# Patient Record
Sex: FEMALE | Race: WHITE | NOT HISPANIC OR LATINO
[De-identification: names, ages, dates, MRNs, and addresses within clinical notes are randomized per-mention and may not be internally consistent; named-entity substitution may affect disease eponyms.]

---

## 2017-03-08 ENCOUNTER — APPOINTMENT (OUTPATIENT)
Dept: FAMILY MEDICINE | Facility: CLINIC | Age: 72
End: 2017-03-08

## 2017-03-08 VITALS
SYSTOLIC BLOOD PRESSURE: 120 MMHG | WEIGHT: 138 LBS | HEART RATE: 84 BPM | HEIGHT: 64 IN | DIASTOLIC BLOOD PRESSURE: 70 MMHG | BODY MASS INDEX: 23.56 KG/M2

## 2017-03-08 DIAGNOSIS — S82.002A UNSPECIFIED FRACTURE OF LEFT PATELLA, INITIAL ENCOUNTER FOR CLOSED FRACTURE: ICD-10-CM

## 2017-03-08 DIAGNOSIS — N39.0 URINARY TRACT INFECTION, SITE NOT SPECIFIED: ICD-10-CM

## 2017-03-08 DIAGNOSIS — R74.0 NONSPECIFIC ELEVATION OF LEVELS OF TRANSAMINASE AND LACTIC ACID DEHYDROGENASE [LDH]: ICD-10-CM

## 2017-08-30 ENCOUNTER — RX RENEWAL (OUTPATIENT)
Age: 72
End: 2017-08-30

## 2018-03-05 ENCOUNTER — RX RENEWAL (OUTPATIENT)
Age: 73
End: 2018-03-05

## 2018-03-19 ENCOUNTER — APPOINTMENT (OUTPATIENT)
Dept: FAMILY MEDICINE | Facility: CLINIC | Age: 73
End: 2018-03-19
Payer: MEDICARE

## 2018-03-19 ENCOUNTER — NON-APPOINTMENT (OUTPATIENT)
Age: 73
End: 2018-03-19

## 2018-03-19 VITALS
HEART RATE: 70 BPM | HEIGHT: 64 IN | DIASTOLIC BLOOD PRESSURE: 70 MMHG | BODY MASS INDEX: 24.75 KG/M2 | WEIGHT: 145 LBS | SYSTOLIC BLOOD PRESSURE: 132 MMHG

## 2018-03-19 DIAGNOSIS — Z12.31 ENCOUNTER FOR SCREENING MAMMOGRAM FOR MALIGNANT NEOPLASM OF BREAST: ICD-10-CM

## 2018-03-19 DIAGNOSIS — Z23 ENCOUNTER FOR IMMUNIZATION: ICD-10-CM

## 2018-03-19 PROCEDURE — 90732 PPSV23 VACC 2 YRS+ SUBQ/IM: CPT

## 2018-03-19 PROCEDURE — 99214 OFFICE O/P EST MOD 30 MIN: CPT | Mod: 25

## 2018-03-19 PROCEDURE — 93000 ELECTROCARDIOGRAM COMPLETE: CPT

## 2018-03-19 PROCEDURE — G0009: CPT

## 2018-06-28 ENCOUNTER — APPOINTMENT (OUTPATIENT)
Dept: FAMILY MEDICINE | Facility: CLINIC | Age: 73
End: 2018-06-28
Payer: MEDICARE

## 2018-06-28 VITALS
DIASTOLIC BLOOD PRESSURE: 60 MMHG | WEIGHT: 140 LBS | HEIGHT: 65 IN | BODY MASS INDEX: 23.32 KG/M2 | HEART RATE: 63 BPM | SYSTOLIC BLOOD PRESSURE: 155 MMHG

## 2018-06-28 VITALS — SYSTOLIC BLOOD PRESSURE: 130 MMHG | DIASTOLIC BLOOD PRESSURE: 88 MMHG

## 2018-06-28 DIAGNOSIS — R82.90 UNSPECIFIED ABNORMAL FINDINGS IN URINE: ICD-10-CM

## 2018-06-28 PROCEDURE — 99214 OFFICE O/P EST MOD 30 MIN: CPT | Mod: 25

## 2018-06-28 PROCEDURE — 81003 URINALYSIS AUTO W/O SCOPE: CPT | Mod: QW

## 2018-06-28 NOTE — PLAN
[FreeTextEntry1] : STILL NEEDS TO SEE GYN.  STILL NEEDS TO HAVE STOOL OCCULT.  STILL NEEDS MAMMOGRAM AND BONE DENSITY.  STILL NEEDS LAB WORK.\par WILL RECHECK URINE STUDIES\par REMAIN HYDRATED\par MONITOR BLOOD PRESSURE\par COMPLIANCE ADVISED TO PREVENT ADVERSE OUTCOMES TO HEALTH \par CALL WITH ANY QUESTIONS, CONCERNS OR CHANGES

## 2018-06-28 NOTE — REVIEW OF SYSTEMS
[Fever] : no fever [Fatigue] : no fatigue [Discharge] : no discharge [Pain] : no pain [Chest Pain] : no chest pain [Palpitations] : no palpitations [Shortness Of Breath] : no shortness of breath [Cough] : no cough [Abdominal Pain] : no abdominal pain [Nausea] : no nausea [Diarrhea] : diarrhea [Vomiting] : no vomiting [Dysuria] : no dysuria [Hematuria] : no hematuria [Headache] : no headache [Dizziness] : no dizziness [Fainting] : no fainting

## 2018-06-28 NOTE — HISTORY OF PRESENT ILLNESS
[FreeTextEntry8] : PRESENTING FOR ACUTE VISIT TO FOLLOW-UP UTI.  SYMPTOMS STARTED ON MONDAY.  WENT TO URGENT CARE.  GIVEN BACTRIM - GAVE HER "ACHES AND PAINS" AND STOPPED IT.  ONLY TOOK ONE DOSE.  NOW DOESN'T HAVE ANY URINARY TRACT INFECTION SYMPTOMS.  NO D/H/F.  NO HEMATURIA.  NO FEVER OR BACK PAIN.  HAS A CHRONIC HISTORY HYPERTENSION.  TAKING PROPRANOLOL AS DIRECTED.   HAS HAD NO HEADACHE, DIZZINESS, CHEST PAIN, SHORTNESS OF BREATH, GI COMPLAINTS.  NO OTHER COMPLAINTS TODAY. STILL NEEDS TO SEE GYN.  STILL NEEDS TO HAVE STOOL OCCULT.  STILL NEEDS MAMMOGRAM AND BONE DENSITY.  STILL NEEDS LAB WORK.

## 2018-06-28 NOTE — PHYSICAL EXAM
[No Acute Distress] : no acute distress [Well Nourished] : well nourished [Well-Appearing] : well-appearing [Normal Sclera/Conjunctiva] : normal sclera/conjunctiva [PERRL] : pupils equal round and reactive to light [No Respiratory Distress] : no respiratory distress  [Clear to Auscultation] : lungs were clear to auscultation bilaterally [No Accessory Muscle Use] : no accessory muscle use [Normal Rate] : normal rate  [Regular Rhythm] : with a regular rhythm [Normal S1, S2] : normal S1 and S2 [No Murmur] : no murmur heard [Soft] : abdomen soft [Non Tender] : non-tender [Normal Bowel Sounds] : normal bowel sounds [No CVA Tenderness] : no CVA  tenderness [No Joint Swelling] : no joint swelling [Grossly Normal Strength/Tone] : grossly normal strength/tone [Speech Grossly Normal] : speech grossly normal [Memory Grossly Normal] : memory grossly normal [Normal Insight/Judgement] : insight and judgment were intact

## 2018-07-02 ENCOUNTER — RESULT REVIEW (OUTPATIENT)
Age: 73
End: 2018-07-02

## 2018-07-02 LAB
BILIRUB UR QL STRIP: NEGATIVE
CLARITY UR: CLEAR
GLUCOSE UR-MCNC: NEGATIVE
HCG UR QL: NORMAL EU/DL
HGB UR QL STRIP.AUTO: NORMAL
KETONES UR-MCNC: NEGATIVE
LEUKOCYTE ESTERASE UR QL STRIP: NEGATIVE
NITRITE UR QL STRIP: NEGATIVE
PH UR STRIP: 5.5
PROT UR STRIP-MCNC: NEGATIVE
SP GR UR STRIP: <=1.005

## 2018-07-03 LAB — BACTERIA UR CULT: NORMAL

## 2018-07-06 LAB — HEMOCCULT STL QL IA: NEGATIVE

## 2018-07-17 ENCOUNTER — RESULT REVIEW (OUTPATIENT)
Age: 73
End: 2018-07-17

## 2018-07-24 ENCOUNTER — OTHER (OUTPATIENT)
Age: 73
End: 2018-07-24

## 2018-08-20 ENCOUNTER — APPOINTMENT (OUTPATIENT)
Dept: FAMILY MEDICINE | Facility: CLINIC | Age: 73
End: 2018-08-20
Payer: MEDICARE

## 2018-08-20 VITALS
HEART RATE: 84 BPM | WEIGHT: 151 LBS | SYSTOLIC BLOOD PRESSURE: 140 MMHG | HEIGHT: 65 IN | DIASTOLIC BLOOD PRESSURE: 90 MMHG | BODY MASS INDEX: 25.16 KG/M2

## 2018-08-20 DIAGNOSIS — N64.59 OTHER SIGNS AND SYMPTOMS IN BREAST: ICD-10-CM

## 2018-08-20 PROCEDURE — 99214 OFFICE O/P EST MOD 30 MIN: CPT

## 2018-08-20 NOTE — PLAN
[FreeTextEntry1] : RECENT LAB WORK REVIEWED\par RECHECK TFT'S\par REMAIN HYDRATED\par RECHECK CREATININE\par MONITOR BLOOD PRESSURE\par DUE FOR CPE\par CHECK BREAST IMAGING\par CALL WITH ANY QUESTIONS, CONCERNS OR CHANGES \par FLU VACCINE IN THE FALL RECOMMENDED

## 2018-08-20 NOTE — PHYSICAL EXAM
[No Acute Distress] : no acute distress [Well Nourished] : well nourished [Well-Appearing] : well-appearing [Supple] : supple [No Lymphadenopathy] : no lymphadenopathy [No Respiratory Distress] : no respiratory distress  [Clear to Auscultation] : lungs were clear to auscultation bilaterally [No Accessory Muscle Use] : no accessory muscle use [Normal Rate] : normal rate  [Regular Rhythm] : with a regular rhythm [Normal S1, S2] : normal S1 and S2 [No Murmur] : no murmur heard [Soft] : abdomen soft [Non Tender] : non-tender [Normal Bowel Sounds] : normal bowel sounds [No Joint Swelling] : no joint swelling [Grossly Normal Strength/Tone] : grossly normal strength/tone [Speech Grossly Normal] : speech grossly normal [Normal Mood] : the mood was normal [Normal Insight/Judgement] : insight and judgment were intact

## 2018-08-20 NOTE — REVIEW OF SYSTEMS
[Fever] : no fever [Fatigue] : no fatigue [Chest Pain] : no chest pain [Palpitations] : no palpitations [Shortness Of Breath] : no shortness of breath [Cough] : no cough [Abdominal Pain] : no abdominal pain [Nausea] : no nausea [Diarrhea] : diarrhea [Vomiting] : no vomiting [Melena] : no melena [Dysuria] : no dysuria [Hematuria] : no hematuria [Joint Pain] : no joint pain [Muscle Pain] : no muscle pain [Headache] : no headache [Dizziness] : no dizziness [Fainting] : no fainting

## 2018-08-20 NOTE — HISTORY OF PRESENT ILLNESS
[FreeTextEntry1] : F/U LAB WORK [de-identified] : PRESENTING FOR FOLLOW-UP OF LAB WORK.  CHRONIC HISTORY OF HYPERTENSION ON PROPRANOLOL.  BLOOD PRESSURE CONTROLLED.  ELEVATED CREATININE.  NO D/H/F OR HEMATURIA.  SEES ENDOCRINOLOGY DR. GROVE YEARLY FOR MONITORING OF THYROID SONOGRAM.  HAS THYROID NODULE.  HAS HAD NO CHANGE.  NEVER ON MEDICATION FOR THYROID.  NO FATIGUE.  NO CHANGES IN HAIR OR SKIN.  HAD MAMMOGRAM.  NEEDS TO GO BACK FOR ADDITIONAL IMAGING INCLUDING DIAGNOSTIC MAMMOGRAM AND BREAST SONOGRAM.  DENIES ANY BREAST COMPLAINTS.  NO PAIN, DISCHARGE OR LUMPS.

## 2018-09-18 ENCOUNTER — RX RENEWAL (OUTPATIENT)
Age: 73
End: 2018-09-18

## 2019-03-14 ENCOUNTER — RX RENEWAL (OUTPATIENT)
Age: 74
End: 2019-03-14

## 2019-05-06 ENCOUNTER — NON-APPOINTMENT (OUTPATIENT)
Age: 74
End: 2019-05-06

## 2019-05-06 ENCOUNTER — APPOINTMENT (OUTPATIENT)
Dept: FAMILY MEDICINE | Facility: CLINIC | Age: 74
End: 2019-05-06
Payer: MEDICARE

## 2019-05-06 VITALS
DIASTOLIC BLOOD PRESSURE: 80 MMHG | BODY MASS INDEX: 23.82 KG/M2 | SYSTOLIC BLOOD PRESSURE: 128 MMHG | HEART RATE: 80 BPM | WEIGHT: 143 LBS | HEIGHT: 65 IN

## 2019-05-06 PROCEDURE — 99213 OFFICE O/P EST LOW 20 MIN: CPT | Mod: 25

## 2019-05-06 PROCEDURE — 36415 COLL VENOUS BLD VENIPUNCTURE: CPT

## 2019-05-06 PROCEDURE — 93000 ELECTROCARDIOGRAM COMPLETE: CPT

## 2019-05-06 NOTE — PHYSICAL EXAM
[No Acute Distress] : no acute distress [Well Nourished] : well nourished [Well-Appearing] : well-appearing [No Respiratory Distress] : no respiratory distress  [Clear to Auscultation] : lungs were clear to auscultation bilaterally [Normal Rate] : normal rate  [No Accessory Muscle Use] : no accessory muscle use [Regular Rhythm] : with a regular rhythm [Normal S1, S2] : normal S1 and S2 [No Murmur] : no murmur heard [Pedal Pulses Present] : the pedal pulses are present [No Edema] : there was no peripheral edema

## 2019-05-11 NOTE — ADDENDUM
[FreeTextEntry1] : I, Lizzie Warren, acted solely as a scribe for Dr. Samantha Parish on this date 5/6/19.

## 2019-05-11 NOTE — HISTORY OF PRESENT ILLNESS
[FreeTextEntry1] : BLOOD PRESSURE CHECK  [de-identified] : MS. CASTANEDA IS A PLEASANT 72 YO PRESENTING FOR FOLLOW UP.  CHRONIC HISTORY OF HYPERTENSION. BLOOD PRESSURE CONTROLLED ON PROPRANOLOL. HAS NOT RECENTLY SEEN CARDIOLOGY. DUE FOR ECHOCARDIOGRAM. NO CONCERNS AT THIS TIME. ALSO DUE FOR GYN FOLLOW-UP. SAW ENDOCRINOLOGY IN [facility] FOR FOLLOW UP OF THYROID NODULE. ADVISED TO RETURN IN A YEAR. HAS HAD NO CHEST PAIN, SHORTNESS OF BREATH, HEADACHE, DIZZINESS, GI OR URINARY COMPLAINTS. NO OTHER COMPLAINTS TODAY.

## 2019-05-11 NOTE — PLAN
[FreeTextEntry1] : BLOOD PRESSURE CONTROLLED\par EKG: NSR AT 68 BPM, NO ACUTE ST-T WAVE CHANGES\par DUE TO SEE CARDIOLOGY\par REFUSES COLONOSCOPY\par CONTINUE MEDICATIONS AS DIRECTED\par FOLLOW UP WITH ALL SPECIALISTS AS DIRECTED\par CONSIDER SHINGRIX \par FOLLOW UP FOR CPE\par HEALTHY DIET AND LIFESTYLE MODIFICATIONS\par CALL WITH ANY QUESTIONS, CONCERNS OR CHANGES

## 2019-05-12 LAB
ALBUMIN SERPL ELPH-MCNC: 4.3 G/DL
ALP BLD-CCNC: 65 U/L
ALT SERPL-CCNC: 16 U/L
ANION GAP SERPL CALC-SCNC: 11 MMOL/L
AST SERPL-CCNC: 17 U/L
BILIRUB SERPL-MCNC: 0.4 MG/DL
BUN SERPL-MCNC: 15 MG/DL
CALCIUM SERPL-MCNC: 9.5 MG/DL
CHLORIDE SERPL-SCNC: 103 MMOL/L
CHOLEST SERPL-MCNC: 213 MG/DL
CHOLEST/HDLC SERPL: 2.6 RATIO
CO2 SERPL-SCNC: 26 MMOL/L
CREAT SERPL-MCNC: 1.13 MG/DL
GLUCOSE SERPL-MCNC: 94 MG/DL
HDLC SERPL-MCNC: 82 MG/DL
LDLC SERPL CALC-MCNC: 120 MG/DL
POTASSIUM SERPL-SCNC: 4.9 MMOL/L
PROT SERPL-MCNC: 6.5 G/DL
SODIUM SERPL-SCNC: 140 MMOL/L
TRIGL SERPL-MCNC: 53 MG/DL

## 2019-07-17 ENCOUNTER — APPOINTMENT (OUTPATIENT)
Dept: FAMILY MEDICINE | Facility: CLINIC | Age: 74
End: 2019-07-17
Payer: MEDICARE

## 2019-07-17 VITALS
SYSTOLIC BLOOD PRESSURE: 110 MMHG | BODY MASS INDEX: 24.24 KG/M2 | WEIGHT: 142 LBS | HEIGHT: 64 IN | DIASTOLIC BLOOD PRESSURE: 76 MMHG | HEART RATE: 78 BPM

## 2019-07-17 DIAGNOSIS — Z63.4 DISAPPEARANCE AND DEATH OF FAMILY MEMBER: ICD-10-CM

## 2019-07-17 PROCEDURE — G0439: CPT

## 2019-07-17 SDOH — SOCIAL STABILITY - SOCIAL INSECURITY: DISSAPEARANCE AND DEATH OF FAMILY MEMBER: Z63.4

## 2019-07-17 NOTE — HEALTH RISK ASSESSMENT
[Very Good] : ~his/her~  mood as very good [No falls in past year] : Patient reported no falls in the past year [Patient reported mammogram was normal] : Patient reported mammogram was normal [Patient reported bone density results were normal] : Patient reported bone density results were normal [Yes] : Yes [Monthly or less (1 pt)] : Monthly or less (1 point) [No] : In the past 12 months have you used drugs other than those required for medical reasons? No [Patient declined PAP Smear] : Patient declined PAP Smear [Patient declined colonoscopy] : Patient declined colonoscopy [None] : None [Alone] : lives alone [Retired] : retired [] :  [# Of Children ___] : has [unfilled] children [Smoke Detector] : smoke detector [Carbon Monoxide Detector] : carbon monoxide detector [Safety elements used in home] : safety elements used in home [Seat Belt] :  uses seat belt [Sunscreen] : uses sunscreen [0] : 2) Feeling down, depressed, or hopeless: Not at all (0) [Feels Safe at Home] : Feels safe at home [Fully functional (bathing, dressing, toileting, transferring, walking, feeding)] : Fully functional (bathing, dressing, toileting, transferring, walking, feeding) [Fully functional (using the telephone, shopping, preparing meals, housekeeping, doing laundry, using] : Fully functional and needs no help or supervision to perform IADLs (using the telephone, shopping, preparing meals, housekeeping, doing laundry, using transportation, managing medications and managing finances) [Reports normal functional visual acuity (ie: able to read med bottle)] : Reports normal functional visual acuity [With Patient/Caregiver] : With Patient/Caregiver [Designated Healthcare Proxy] : Designated healthcare proxy [Name: ___] : Health Care Proxy's Name: [unfilled]  [Relationship: ___] : Relationship: [unfilled] [Never (0 pts)] : Never (0 points) [HIV test declined] : HIV test declined [Hepatitis C test declined] : Hepatitis C test declined [] : No [de-identified] : RARELY [Audit-CScore] : 1 [de-identified] : WALKING AND AEROBICS ONCE A WEEK [de-identified] : GOOD [[id number]] : 0 [[id number]] : 0 [Change in mental status noted] : No change in mental status noted [Language] : denies difficulty with language [Behavior] : denies difficulty with behavior [Learning/Retaining New Information] : denies difficulty learning/retaining new information [Handling Complex Tasks] : denies difficulty handling complex tasks [Reasoning] : denies difficulty with reasoning [Spatial Ability and Orientation] : denies difficulty with spatial ability and orientation [Sexually Active] : not sexually active [High Risk Behavior] : no high risk behavior [Reports changes in hearing] : Reports no changes in hearing [Reports changes in vision] : Reports no changes in vision [Reports changes in dental health] : Reports no changes in dental health [MammogramDate] : 8/18 [BoneDensityDate] : 7/18 [de-identified] : GLASSES FOR READING [AdvancecareDate] : 7/19

## 2019-07-17 NOTE — COUNSELING
[Healthy eating counseling provided] : healthy eating [Low Fat Diet] : Low fat diet [Low Salt Diet] : Low salt diet [Activity counseling provided] : activity [Walking] : Walking [None] : None

## 2019-07-17 NOTE — PHYSICAL EXAM
[No Acute Distress] : no acute distress [Well Nourished] : well nourished [Well-Appearing] : well-appearing [Normal Sclera/Conjunctiva] : normal sclera/conjunctiva [PERRL] : pupils equal round and reactive to light [Normal Outer Ear/Nose] : the outer ears and nose were normal in appearance [Normal Oropharynx] : the oropharynx was normal [Normal TMs] : both tympanic membranes were normal [No Lymphadenopathy] : no lymphadenopathy [Supple] : supple [No Respiratory Distress] : no respiratory distress  [No Accessory Muscle Use] : no accessory muscle use [Clear to Auscultation] : lungs were clear to auscultation bilaterally [Normal Rate] : normal rate  [Regular Rhythm] : with a regular rhythm [Normal S1, S2] : normal S1 and S2 [No Murmur] : no murmur heard [Pedal Pulses Present] : the pedal pulses are present [No Edema] : there was no peripheral edema [Soft] : abdomen soft [Non Tender] : non-tender [Normal Bowel Sounds] : normal bowel sounds [Coordination Grossly Intact] : coordination grossly intact [No Focal Deficits] : no focal deficits [Normal Gait] : normal gait [Deep Tendon Reflexes (DTR)] : deep tendon reflexes were 2+ and symmetric [Speech Grossly Normal] : speech grossly normal [Normal Affect] : the affect was normal [Normal Mood] : the mood was normal [Normal Insight/Judgement] : insight and judgment were intact [EOMI] : extraocular movements intact [No Joint Swelling] : no joint swelling [Grossly Normal Strength/Tone] : grossly normal strength/tone [No Rash] : no rash

## 2019-07-17 NOTE — PLAN
[FreeTextEntry1] : CHECK ROUTINE LAB WORK \par STOOL OCCULT\par VISION SCREENING\par CONSIDER TDAP AND SHINGRIX\par FLU VACCINE IN THE FALL RECOMMENDED \par HEALTHY DIET AND LIFESTYLE MODIFICATIONS\par BLOOD PRESSURE CONTROLLED\par CONTINUE ALL MEDICATIONS AS DIRECTED\par REFERRAL TO RHEUMATOLOGY FOR OSTEOPOROSIS RE-ADVISED\par REFUSES GYN OR COLONOSCOPY\par FOLLOW-UP WITH ALL SPECIALISTS AS DIRECTED \par EKG DECLINED; CARDIOLOGY FOR ECHOCARDIOGRAM AND SCREENING EXAMS\par CALL WITH ANY QUESTIONS, CONCERNS OR CHANGES.

## 2019-07-17 NOTE — HISTORY OF PRESENT ILLNESS
[FreeTextEntry1] : WELLNESS EXAM. [de-identified] : MS. CASTANEDA IS A PLEASANT 72 YO PRESENTING FOR YEARLY WELLNESS EXAM. CHRONIC HISTORY OF HYPERTENSION. BLOOD PRESSURE CONTROLLED ON PROPRANOLOL. SEES ENDOCRINOLOGY [facility] IN FOLLOW-UP OF THYROID. WILL NOT FOLLOW -UP WITH GYN OR GO FOR COLONOSCOPY. WILL BE DUE FOR MAMMOGRAM NEXT MONTH. NO BREAST PAIN, LUMPS OR NIPPLE DISCHARGE.  HAS NOT FOLLOWED UP WITH RHEUMATOLOGY FOR OSTEOPOROSIS. DUE TO SEE CARDIOLOGY. HAS YEARLY EYE EXAM. HAS HAD NO CHEST PAIN, SHORTNESS OF BREATH, HEADACHE, DIZZINESS, GI OR URINARY COMPLAINTS. NO OTHER COMPLAINTS TODAY. \par

## 2019-12-03 ENCOUNTER — RX RENEWAL (OUTPATIENT)
Age: 74
End: 2019-12-03

## 2020-08-05 ENCOUNTER — NON-APPOINTMENT (OUTPATIENT)
Age: 75
End: 2020-08-05

## 2020-08-05 ENCOUNTER — APPOINTMENT (OUTPATIENT)
Dept: FAMILY MEDICINE | Facility: CLINIC | Age: 75
End: 2020-08-05
Payer: MEDICARE

## 2020-08-05 VITALS — SYSTOLIC BLOOD PRESSURE: 126 MMHG | DIASTOLIC BLOOD PRESSURE: 80 MMHG

## 2020-08-05 VITALS
HEART RATE: 88 BPM | WEIGHT: 143 LBS | SYSTOLIC BLOOD PRESSURE: 148 MMHG | DIASTOLIC BLOOD PRESSURE: 70 MMHG | BODY MASS INDEX: 24.41 KG/M2 | HEIGHT: 64 IN

## 2020-08-05 DIAGNOSIS — M72.2 PLANTAR FASCIAL FIBROMATOSIS: ICD-10-CM

## 2020-08-05 PROCEDURE — 93000 ELECTROCARDIOGRAM COMPLETE: CPT

## 2020-08-05 PROCEDURE — 99214 OFFICE O/P EST MOD 30 MIN: CPT | Mod: 25

## 2020-08-05 NOTE — HISTORY OF PRESENT ILLNESS
[FreeTextEntry1] : f/u hypertension [de-identified] : MS. CASTANEDA IS A PLEASANT 73 YO PRESENTING FOR FOLLOW-UP OF HYPERTENSION.  ON PROPRANOLOL DAILY AS DIRECTED.  DOES NOT MONITOR BLOOD PRESSURE AT HOME.  LAST CARDIOLOGY EVALUATION YEARS AGO.  FEELING WELL TODAY.  IS DUE TO SEE ENDOCRINOLOGY IN FOLLOW-UP OF THYROID.  HISTORY OF OSTEOPOROSIS.  NO RECENT FALLS.  HAS DECLINED MEDICATION PREVIOUSLY.  PRIOR ELEVATED CHOLESTEROL BUT GOOD HDL.  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   HAS HAD NO HEADACHE, DIZZINESS, CHEST PAIN, SHORTNESS OF BREATH, GI OR URINARY COMPLAINTS.  NO OTHER COMPLAINTS TODAY.

## 2020-08-05 NOTE — PHYSICAL EXAM
[No Acute Distress] : no acute distress [Well Nourished] : well nourished [Well-Appearing] : well-appearing [Normal Sclera/Conjunctiva] : normal sclera/conjunctiva [PERRL] : pupils equal round and reactive to light [No Respiratory Distress] : no respiratory distress  [Supple] : supple [No Lymphadenopathy] : no lymphadenopathy [Normal Rate] : normal rate  [No Accessory Muscle Use] : no accessory muscle use [Clear to Auscultation] : lungs were clear to auscultation bilaterally [Regular Rhythm] : with a regular rhythm [Soft] : abdomen soft [Normal S1, S2] : normal S1 and S2 [Normal Bowel Sounds] : normal bowel sounds [No Joint Swelling] : no joint swelling [Non Tender] : non-tender [Grossly Normal Strength/Tone] : grossly normal strength/tone

## 2020-08-05 NOTE — PLAN
[FreeTextEntry1] : WILL NO LONGER SEE GYN OR GO FOR MAMMOGRAM - AWARE TO SCREEN FOR CANCER\par CHECK BONE DENSITY\par CHECK LAB WORK\par EKG: NSR AT 63 BPM, NO ACUTE ST-T WAVE CHANGES\par CARDIOLOGY FOLLOW-UP DUE\par BLOOD PRESSURE CONTROLLED\par CONTINUE CURRENT MEDICATIONS\par FOLLOW-UP ALL SPECIALISTS AS DIRECTED \par FOLLOW-UP FOR CPE\par FLU VACCINE IN THE FALL\par CALL WITH ANY QUESTIONS, CONCERNS OR CHANGES

## 2021-04-28 ENCOUNTER — RX RENEWAL (OUTPATIENT)
Age: 76
End: 2021-04-28

## 2021-05-10 ENCOUNTER — LABORATORY RESULT (OUTPATIENT)
Age: 76
End: 2021-05-10

## 2021-05-10 ENCOUNTER — APPOINTMENT (OUTPATIENT)
Dept: RHEUMATOLOGY | Facility: CLINIC | Age: 76
End: 2021-05-10
Payer: MEDICARE

## 2021-05-10 VITALS
HEART RATE: 76 BPM | HEIGHT: 64 IN | DIASTOLIC BLOOD PRESSURE: 78 MMHG | RESPIRATION RATE: 17 BRPM | TEMPERATURE: 98.1 F | SYSTOLIC BLOOD PRESSURE: 120 MMHG | OXYGEN SATURATION: 99 %

## 2021-05-10 DIAGNOSIS — Z87.19 PERSONAL HISTORY OF OTHER DISEASES OF THE DIGESTIVE SYSTEM: ICD-10-CM

## 2021-05-10 DIAGNOSIS — Z82.49 FAMILY HISTORY OF ISCHEMIC HEART DISEASE AND OTHER DISEASES OF THE CIRCULATORY SYSTEM: ICD-10-CM

## 2021-05-10 DIAGNOSIS — Z87.891 PERSONAL HISTORY OF NICOTINE DEPENDENCE: ICD-10-CM

## 2021-05-10 PROCEDURE — 99203 OFFICE O/P NEW LOW 30 MIN: CPT

## 2021-05-10 NOTE — HISTORY OF PRESENT ILLNESS
[FreeTextEntry1] : 75 year old female with PMH as listed below presents today for an initial evaluation for OP\par \par Last DEXA 10/2020: OP. L1-L4: T score: -1.7, Left femoral neck: T -2.7. Left total: -2.3. \par Currently taking calcium/vitamin d supplements\par previously treated with medications > 5 years ago by GYN, doesn’t remember name. \par hx of patella fxs\par + mother with hip fracture\par denies estrogen use\par denies steroid use\par denies hx of recent falls\par denies any upcoming or planned jaw surgery ,extractions, etc.\par denies cigarette. denies alcohol use\par last menses: 1994\par +duodenal ulcers\par

## 2021-05-10 NOTE — ASSESSMENT
[FreeTextEntry1] : OP, postmenopausal\par \par - labs as below\par - calcium and vitamin d supplementation. \par - regular exercise: aerobic and resistance\par - fall prevention\par - not a candidate for oral bisphosphonates as she has hx of duodenal ulcers\par - will plan to start therapy with IV Reclast after reviewing labs\par \par Discussed with the patient risks and benefits of bisphosphonates including, but not limited to, loss of appetite atypical bone fractures, gastritis, hypocalcemia, esophagitis, gastrointestinal strictures, bone and muscle pain, TM/jaw osteonecrosis (bone necrosis)\par \par Discussed treatment plan with the patient. The patient was given the opportunity to ask questions and all questions were answered to their satisfaction.

## 2021-05-10 NOTE — PHYSICAL EXAM
[General Appearance - Alert] : alert [General Appearance - In No Acute Distress] : in no acute distress [General Appearance - Well Nourished] : well nourished [General Appearance - Well Developed] : well developed [Sclera] : the sclera and conjunctiva were normal [Extraocular Movements] : extraocular movements were intact [PERRL With Normal Accommodation] : pupils were equal in size, round, and reactive to light [Outer Ear] : the ears and nose were normal in appearance [Neck Appearance] : the appearance of the neck was normal [Jugular Venous Distention Increased] : there was no jugular-venous distention [Auscultation Breath Sounds / Voice Sounds] : lungs were clear to auscultation bilaterally [Heart Rate And Rhythm] : heart rate was normal and rhythm regular [Heart Sounds] : normal S1 and S2 [Heart Sounds Gallop] : no gallops [Murmurs] : no murmurs [Heart Sounds Pericardial Friction Rub] : no pericardial rub [Bowel Sounds] : normal bowel sounds [Abdomen Soft] : soft [Abdomen Tenderness] : non-tender [No CVA Tenderness] : no ~M costovertebral angle tenderness [No Spinal Tenderness] : no spinal tenderness [Abnormal Walk] : normal gait [Nail Clubbing] : no clubbing  or cyanosis of the fingernails [Involuntary Movements] : no involuntary movements were seen [Musculoskeletal - Swelling] : no joint swelling seen [Motor Tone] : muscle strength and tone were normal [] : no rash [Skin Lesions] : no skin lesions [Sensation] : the sensory exam was normal to light touch and pinprick [Motor Exam] : the motor exam was normal [No Focal Deficits] : no focal deficits [Oriented To Time, Place, And Person] : oriented to person, place, and time

## 2021-05-22 LAB
25(OH)D3 SERPL-MCNC: 51.3 NG/ML
ALBUMIN MFR SERPL ELPH: 61 %
ALBUMIN SERPL ELPH-MCNC: 4.7 G/DL
ALBUMIN SERPL-MCNC: 4.1 G/DL
ALBUMIN/GLOB SERPL: 1.6 RATIO
ALBUPE: 19.9 %
ALP BLD-CCNC: 81 U/L
ALPHA1 GLOB MFR SERPL ELPH: 3.6 %
ALPHA1 GLOB SERPL ELPH-MCNC: 0.2 G/DL
ALPHA1UPE: 33.5 %
ALPHA2 GLOB MFR SERPL ELPH: 11.8 %
ALPHA2 GLOB SERPL ELPH-MCNC: 0.8 G/DL
ALPHA2UPE: 22 %
ALT SERPL-CCNC: 15 U/L
ANION GAP SERPL CALC-SCNC: 11 MMOL/L
AST SERPL-CCNC: 17 U/L
B-GLOBULIN MFR SERPL ELPH: 12 %
B-GLOBULIN SERPL ELPH-MCNC: 0.8 G/DL
BASOPHILS # BLD AUTO: 0.07 K/UL
BASOPHILS NFR BLD AUTO: 1 %
BETAUPE: 15.2 %
BILIRUB SERPL-MCNC: 0.4 MG/DL
BUN SERPL-MCNC: 19 MG/DL
CALCIUM SERPL-MCNC: 9.7 MG/DL
CALCIUM SERPL-MCNC: 9.7 MG/DL
CHLORIDE SERPL-SCNC: 103 MMOL/L
CK SERPL-CCNC: 46 U/L
CO2 SERPL-SCNC: 27 MMOL/L
COLLAGEN CTX SERPL-MCNC: 322 PG/ML
CREAT 24H UR-MCNC: NORMAL G/24 H
CREAT SERPL-MCNC: 1.06 MG/DL
CREAT SPEC-SCNC: 107 MG/DL
CREAT/PROT UR: 0.1 RATIO
CREATININE UR (MAYO): 107 MG/DL
EOSINOPHIL # BLD AUTO: 0.26 K/UL
EOSINOPHIL NFR BLD AUTO: 3.7 %
GAMMA GLOB FLD ELPH-MCNC: 0.8 G/DL
GAMMA GLOB MFR SERPL ELPH: 11.6 %
GAMMAUPE: 9.4 %
GLUCOSE SERPL-MCNC: 94 MG/DL
HCT VFR BLD CALC: 39.8 %
HGB BLD-MCNC: 13.1 G/DL
IGA 24H UR QL IFE: NORMAL
IMM GRANULOCYTES NFR BLD AUTO: 0.1 %
INTERPRETATION SERPL IEP-IMP: NORMAL
KAPPA LC 24H UR QL: NORMAL
LYMPHOCYTES # BLD AUTO: 1.94 K/UL
LYMPHOCYTES NFR BLD AUTO: 27.6 %
M PROTEIN SPEC IFE-MCNC: NORMAL
MAGNESIUM SERPL-MCNC: 2.4 MG/DL
MAN DIFF?: NORMAL
MCHC RBC-ENTMCNC: 32.9 GM/DL
MCHC RBC-ENTMCNC: 33.4 PG
MCV RBC AUTO: 101.5 FL
MONOCYTES # BLD AUTO: 0.66 K/UL
MONOCYTES NFR BLD AUTO: 9.4 %
NEUTROPHILS # BLD AUTO: 4.08 K/UL
NEUTROPHILS NFR BLD AUTO: 58.2 %
PARATHYROID HORMONE INTACT: 24 PG/ML
PHOSPHATE SERPL-MCNC: 3.7 MG/DL
PLATELET # BLD AUTO: 239 K/UL
POTASSIUM SERPL-SCNC: 4.8 MMOL/L
PROT PATTERN 24H UR ELPH-IMP: NORMAL
PROT SERPL-MCNC: 6.7 G/DL
PROT UR-MCNC: 6 MG/DL
RBC # BLD: 3.92 M/UL
RBC # FLD: 12.5 %
SODIUM SERPL-SCNC: 142 MMOL/L
SPECIMEN VOL 24H UR: NORMAL ML
TSH SERPL-ACNC: 5.75 UIU/ML
WBC # FLD AUTO: 7.02 K/UL

## 2021-07-22 ENCOUNTER — RX RENEWAL (OUTPATIENT)
Age: 76
End: 2021-07-22

## 2021-09-03 ENCOUNTER — APPOINTMENT (OUTPATIENT)
Dept: FAMILY MEDICINE | Facility: CLINIC | Age: 76
End: 2021-09-03
Payer: MEDICARE

## 2021-09-03 ENCOUNTER — NON-APPOINTMENT (OUTPATIENT)
Age: 76
End: 2021-09-03

## 2021-09-03 VITALS
WEIGHT: 143 LBS | HEIGHT: 64 IN | SYSTOLIC BLOOD PRESSURE: 110 MMHG | HEART RATE: 80 BPM | BODY MASS INDEX: 24.41 KG/M2 | DIASTOLIC BLOOD PRESSURE: 60 MMHG

## 2021-09-03 PROCEDURE — 99213 OFFICE O/P EST LOW 20 MIN: CPT | Mod: 25

## 2021-09-03 PROCEDURE — 93000 ELECTROCARDIOGRAM COMPLETE: CPT

## 2021-09-03 PROCEDURE — G0439: CPT

## 2021-09-05 NOTE — HEALTH RISK ASSESSMENT
[Good] : ~his/her~  mood as  good [No] : In the past 12 months have you used drugs other than those required for medical reasons? No [No falls in past year] : Patient reported no falls in the past year [Patient declined mammogram] : Patient declined mammogram [Patient declined PAP Smear] : Patient declined PAP Smear [Patient declined colonoscopy] : Patient declined colonoscopy [HIV test declined] : HIV test declined [Hepatitis C test declined] : Hepatitis C test declined [None] : None [Alone] : lives alone [Retired] : retired [College] : College [] :  [# Of Children ___] : has [unfilled] children [Feels Safe at Home] : Feels safe at home [Fully functional (bathing, dressing, toileting, transferring, walking, feeding)] : Fully functional (bathing, dressing, toileting, transferring, walking, feeding) [Fully functional (using the telephone, shopping, preparing meals, housekeeping, doing laundry, using] : Fully functional and needs no help or supervision to perform IADLs (using the telephone, shopping, preparing meals, housekeeping, doing laundry, using transportation, managing medications and managing finances) [Reports normal functional visual acuity (ie: able to read med bottle)] : Reports normal functional visual acuity [Smoke Detector] : smoke detector [Carbon Monoxide Detector] : carbon monoxide detector [Safety elements used in home] : safety elements used in home [Seat Belt] :  uses seat belt [Sunscreen] : uses sunscreen [With Patient/Caregiver] : , with patient/caregiver [Designated Healthcare Proxy] : Designated healthcare proxy [Name: ___] : Health Care Proxy's Name: [unfilled]  [Relationship: ___] : Relationship: [unfilled] [0] : 2) Feeling down, depressed, or hopeless: Not at all (0) [PHQ-2 Negative - No further assessment needed] : PHQ-2 Negative - No further assessment needed [] : No [de-identified] : "good.  i try to eat healthy" [de-identified] : exercise classes one hour a week, plans to start walking [[id number]] : 0 [Change in mental status noted] : No change in mental status noted [Language] : denies difficulty with language [Learning/Retaining New Information] : denies difficulty learning/retaining new information [Handling Complex Tasks] : denies difficulty handling complex tasks [Sexually Active] : not sexually active [High Risk Behavior] : no high risk behavior [Reports changes in hearing] : Reports no changes in hearing [Reports changes in vision] : Reports no changes in vision [Reports changes in dental health] : Reports no changes in dental health [BoneDensityDate] : 10/20 [de-identified] : CHRONIC HEARING LOSS [de-identified] : READING GLASSES [AdvancecareDate] : 09/21 [FreeTextEntry4] : Secondary Health Care Proxy: Jie Buchanan - daughter

## 2021-09-05 NOTE — PHYSICAL EXAM
[No Acute Distress] : no acute distress [Well Nourished] : well nourished [Well-Appearing] : well-appearing [Normal Sclera/Conjunctiva] : normal sclera/conjunctiva [PERRL] : pupils equal round and reactive to light [Normal Outer Ear/Nose] : the outer ears and nose were normal in appearance [Normal Oropharynx] : the oropharynx was normal [No Lymphadenopathy] : no lymphadenopathy [Supple] : supple [No Respiratory Distress] : no respiratory distress  [No Accessory Muscle Use] : no accessory muscle use [Clear to Auscultation] : lungs were clear to auscultation bilaterally [Normal Rate] : normal rate  [Regular Rhythm] : with a regular rhythm [Normal S1, S2] : normal S1 and S2 [Soft] : abdomen soft [Non Tender] : non-tender [Normal Bowel Sounds] : normal bowel sounds [No Joint Swelling] : no joint swelling [Grossly Normal Strength/Tone] : grossly normal strength/tone [No Rash] : no rash [Coordination Grossly Intact] : coordination grossly intact [No Focal Deficits] : no focal deficits [Normal Gait] : normal gait [Deep Tendon Reflexes (DTR)] : deep tendon reflexes were 2+ and symmetric [Speech Grossly Normal] : speech grossly normal [Memory Grossly Normal] : memory grossly normal [Normal Mood] : the mood was normal

## 2021-09-05 NOTE — PLAN
[FreeTextEntry1] : VISION SCREENING\par RX FOR MAMMOGRAM\par WILL NO LONGER SEE GYN\par WILL NOT GO FOR COLONOSCOPY\par VACCINATIONS DISCUSSED: FLU, COVID, TDAP AND SHINGRIX\par \par BLOOD PRESSURE CONTROLLED\par WILL CONTINUE PROPRANOLOL 80 MG DAILY\par HEALTHY DIET AND LIFESTYLE MODIFICATIONS\par EKG: NSR AT 60 BPM, NO ACUTE ST-T WAVE CHANGES\par CARDIOLOGY FOR FOLLOW-UP\par LAB WORK, RHEUMATOLOGY CONSULTANT NOTE AND BONE DENSITY REVIEWED\par CALL WITH ANY QUESTIONS, CONCERNS OR CHANGES \par \par

## 2021-09-05 NOTE — HISTORY OF PRESENT ILLNESS
[FreeTextEntry1] : WELLNESS EXAM\par \par FOLLOW-UP HYPERTENSION [de-identified] : MS. CASTANEDA IS A PLEASANT 74 YO PRESENTING FOR YEARLY WELLNESS EXAM.  HAS BEEN FEELING GOOD WITH NO CONCERNS TODAY.  DUE FOR MAMMOGRAM.  HAS NO BREAST COMPLAINTS.  NO PAIN, NIPPLE DISCHARGE, SKIN CHANGES OR LUMPS. \par \par \par IS ALSO HERE FOR CHRONIC CARE.  CHRONIC HISTORY OF HYPERTENSION.  BLOOD PRESSURE CONTROLLED ON PROPRANOLOL.  HAS HAD NO SIDE EFFECTS FROM MEDICATION.  STILL NEEDS TO SEE CARDIOLOGY IN FOLLOW-UP.  SAW ENDOCRINOLOGY DR. GROVE A FEW MONTHS AGO.  HAD REPEAT THYROID SONOGRAM AND ADVISED NO FURTHER STUDIES NEEDED.

## 2021-11-17 ENCOUNTER — APPOINTMENT (OUTPATIENT)
Dept: FAMILY MEDICINE | Facility: CLINIC | Age: 76
End: 2021-11-17

## 2022-02-27 ENCOUNTER — RX RENEWAL (OUTPATIENT)
Age: 77
End: 2022-02-27

## 2022-04-08 ENCOUNTER — APPOINTMENT (OUTPATIENT)
Dept: FAMILY MEDICINE | Facility: CLINIC | Age: 77
End: 2022-04-08
Payer: MEDICARE

## 2022-04-08 VITALS
BODY MASS INDEX: 22.99 KG/M2 | HEIGHT: 65 IN | OXYGEN SATURATION: 98 % | DIASTOLIC BLOOD PRESSURE: 60 MMHG | WEIGHT: 138 LBS | HEART RATE: 87 BPM | SYSTOLIC BLOOD PRESSURE: 116 MMHG

## 2022-04-08 PROCEDURE — 99214 OFFICE O/P EST MOD 30 MIN: CPT

## 2022-04-09 NOTE — HISTORY OF PRESENT ILLNESS
[FreeTextEntry1] : F/U HYPERTENSION [de-identified] : MS. CASTANEDA IS A PLEASANT 75 YO PRESENTING FOR FOLLOW-UP.  HISTORY OF HYPERTENSION, HYPERCHOLESTEROELMIA AND OSTEOPOROSIS.  HAS BEEN MORE ACTIVE.  DIET IS GOOD.  BLOOD PRESSURE CONTROLLED ON PROPRANOLOL.  DID NOT FOLLOW-UP WITH RHEUMATOLOGY.  DOES NOT WANT TREATMENT FOR OSTEOPOROSIS.  HAD COVID BOOSTER.  DOES NOT WANT TO GO FOR MAMMOGRAM.  DUE FOR MAMMOGRAM.  HAS NO BREAST COMPLAINTS.  NO PAIN, NIPPLE DISCHARGE, SKIN CHANGES OR LUMPS.\par CARDIOLOGY DR. MYERS - END OF THE MONTH

## 2022-04-09 NOTE — HEALTH RISK ASSESSMENT
[0] : 2) Feeling down, depressed, or hopeless: Not at all (0) [PHQ-2 Negative - No further assessment needed] : PHQ-2 Negative - No further assessment needed [[id number]] : 0

## 2022-04-09 NOTE — PLAN
[FreeTextEntry1] : BLOOD PRESSURE CONTROLLED\par CONTINUE PROPRANOLOL 80 MG DAILY\par HEALTHY DIET AND LIFESTYLE MODIFICATIONS\par CHECK LAB WORK INCLUDING LIPIDS - PRIOR REVIEWED\par PRIOR MAMMOGRAM REVIEWED AND ORDERED\par FALL PRECAUTIONS\par RHEUMATOLOGY CONSULTANT NOTE REVIEWED; TO FOLLOW-UP FOR OSTEOPOROSIS\par CALL WITH ANY QUESTIONS, CONCERNS OR CHANGES \par COVID BOOSTER DISCUSSED

## 2022-06-01 ENCOUNTER — APPOINTMENT (OUTPATIENT)
Dept: CARDIOLOGY | Facility: CLINIC | Age: 77
End: 2022-06-01

## 2022-07-20 ENCOUNTER — NON-APPOINTMENT (OUTPATIENT)
Age: 77
End: 2022-07-20

## 2022-07-20 ENCOUNTER — APPOINTMENT (OUTPATIENT)
Dept: CARDIOLOGY | Facility: CLINIC | Age: 77
End: 2022-07-20

## 2022-07-20 VITALS
TEMPERATURE: 98.2 F | SYSTOLIC BLOOD PRESSURE: 118 MMHG | HEIGHT: 65 IN | OXYGEN SATURATION: 95 % | HEART RATE: 86 BPM | BODY MASS INDEX: 21.99 KG/M2 | DIASTOLIC BLOOD PRESSURE: 80 MMHG | WEIGHT: 132 LBS

## 2022-07-20 VITALS — SYSTOLIC BLOOD PRESSURE: 110 MMHG | DIASTOLIC BLOOD PRESSURE: 78 MMHG

## 2022-07-20 PROCEDURE — 99205 OFFICE O/P NEW HI 60 MIN: CPT

## 2022-07-20 PROCEDURE — 93000 ELECTROCARDIOGRAM COMPLETE: CPT

## 2022-07-20 RX ORDER — ZOLEDRONIC ACID 5 MG/100ML
5 INJECTION INTRAVENOUS
Qty: 1 | Refills: 0 | Status: DISCONTINUED | COMMUNITY
Start: 2021-06-10 | End: 2022-07-20

## 2022-07-20 RX ORDER — CALCIUM CITRATE/VITAMIN D3 315MG-6.25
TABLET ORAL DAILY
Refills: 0 | Status: ACTIVE | COMMUNITY

## 2022-07-20 NOTE — HISTORY OF PRESENT ILLNESS
[FreeTextEntry1] : 75 y/o female with PMhx of HTN diagnoed 40 years ago  controlled on propranolol , osteoporosis and dudoenal ulcer history \par here for cardiovascular risk evaluation \par no diabetes or hyperlipidemia\par smoked for 10 years 1ppd and quit 40 years ago \par no exertional chest pain or  SOB, palpitations, dizziness or syncope on usually daily activities \par No history of CVA or TIA \par no history of PVD, no heart failure.\par \par

## 2022-07-20 NOTE — ASSESSMENT
[FreeTextEntry1] : Long stating hypertension , currently controlled on propranolol 80 mg extended release\par given long standing hypertension , will order an echocardiogram to evaluate LV function and presence of LVH .

## 2022-08-02 ENCOUNTER — APPOINTMENT (OUTPATIENT)
Dept: CARDIOLOGY | Facility: CLINIC | Age: 77
End: 2022-08-02

## 2022-08-02 PROCEDURE — 93306 TTE W/DOPPLER COMPLETE: CPT

## 2022-08-25 ENCOUNTER — RX RENEWAL (OUTPATIENT)
Age: 77
End: 2022-08-25

## 2023-05-04 ENCOUNTER — APPOINTMENT (OUTPATIENT)
Dept: FAMILY MEDICINE | Facility: CLINIC | Age: 78
End: 2023-05-04
Payer: MEDICARE

## 2023-05-04 VITALS
BODY MASS INDEX: 22.49 KG/M2 | HEART RATE: 78 BPM | HEIGHT: 65 IN | SYSTOLIC BLOOD PRESSURE: 110 MMHG | DIASTOLIC BLOOD PRESSURE: 66 MMHG | WEIGHT: 135 LBS

## 2023-05-04 VITALS — DIASTOLIC BLOOD PRESSURE: 74 MMHG | SYSTOLIC BLOOD PRESSURE: 114 MMHG

## 2023-05-04 DIAGNOSIS — M81.0 AGE-RELATED OSTEOPOROSIS W/OUT CURRENT PATHOLOGICAL FRACTURE: ICD-10-CM

## 2023-05-04 DIAGNOSIS — E78.00 PURE HYPERCHOLESTEROLEMIA, UNSPECIFIED: ICD-10-CM

## 2023-05-04 PROCEDURE — 99213 OFFICE O/P EST LOW 20 MIN: CPT

## 2023-05-04 RX ORDER — DENOSUMAB 60 MG/ML
INJECTION SUBCUTANEOUS
Refills: 0 | Status: ACTIVE | COMMUNITY

## 2023-05-06 PROBLEM — E78.00 HYPERCHOLESTEROLEMIA: Status: ACTIVE | Noted: 2020-08-05

## 2023-05-06 PROBLEM — M81.0 OSTEOPOROSIS: Status: ACTIVE | Noted: 2017-03-08

## 2023-05-06 NOTE — HISTORY OF PRESENT ILLNESS
[FreeTextEntry1] : F/U HYPERTENSION [de-identified] : MS. CASTANEDA IS A PLEASANT 78 YO PRESENTING FOR FOLLOW-UP.  HISTORY OF HYPERTENSION, HYPERCHOLESTEROLEMIA AND OSTEOPOROSIS.  HAD ECHOCARDIOGRAM WITH CARDIOLOGY.  BLOOD PRESSURE HAS BEEN WELL CONTROLLED ON PROPRANOLOL.  ALSO SAW RHEUMATOLOGY FOR OSTEOPOROSIS -  NOW GETTING PROLIA.  IS FEELING WELL TODAY.

## 2023-05-06 NOTE — PLAN
[FreeTextEntry1] : CARDIOLOGY CONSULTANT NOTE APPRECIATED\par WILL GET UPDATED LAB WORK INCLUDING LIPIDS\par HEALTHY DIET AND LIFESTYLE MODIFICATIONS\par FALL PRECAUTIONS\par FOLLOW-UP FOR CPE AND HCM\par FOLLOW-UP ALL SPECIALISTS AS DIRECTED \par CALL WITH ANY QUESTIONS, CONCERNS OR CHANGES

## 2023-09-26 ENCOUNTER — APPOINTMENT (OUTPATIENT)
Dept: FAMILY MEDICINE | Facility: CLINIC | Age: 78
End: 2023-09-26
Payer: MEDICARE

## 2023-09-26 ENCOUNTER — LABORATORY RESULT (OUTPATIENT)
Age: 78
End: 2023-09-26

## 2023-09-26 VITALS
BODY MASS INDEX: 23.32 KG/M2 | DIASTOLIC BLOOD PRESSURE: 62 MMHG | SYSTOLIC BLOOD PRESSURE: 128 MMHG | HEART RATE: 77 BPM | OXYGEN SATURATION: 99 % | HEIGHT: 65 IN | WEIGHT: 140 LBS

## 2023-09-26 DIAGNOSIS — R71.8 OTHER ABNORMALITY OF RED BLOOD CELLS: ICD-10-CM

## 2023-09-26 DIAGNOSIS — Z00.00 ENCOUNTER FOR GENERAL ADULT MEDICAL EXAMINATION W/OUT ABNORMAL FINDINGS: ICD-10-CM

## 2023-09-26 DIAGNOSIS — R79.89 OTHER SPECIFIED ABNORMAL FINDINGS OF BLOOD CHEMISTRY: ICD-10-CM

## 2023-09-26 DIAGNOSIS — E03.8 OTHER SPECIFIED HYPOTHYROIDISM: ICD-10-CM

## 2023-09-26 DIAGNOSIS — E04.1 NONTOXIC SINGLE THYROID NODULE: ICD-10-CM

## 2023-09-26 DIAGNOSIS — I10 ESSENTIAL (PRIMARY) HYPERTENSION: ICD-10-CM

## 2023-09-26 PROCEDURE — G0439: CPT

## 2023-09-26 PROCEDURE — 99214 OFFICE O/P EST MOD 30 MIN: CPT | Mod: 25

## 2023-09-26 PROCEDURE — 36415 COLL VENOUS BLD VENIPUNCTURE: CPT

## 2023-09-26 PROCEDURE — 93000 ELECTROCARDIOGRAM COMPLETE: CPT

## 2023-09-27 LAB
T4 FREE SERPL-MCNC: 1.4 NG/DL
TSH SERPL-ACNC: 3.89 UIU/ML
VIT B12 SERPL-MCNC: 586 PG/ML

## 2023-10-03 LAB
ANION GAP SERPL CALC-SCNC: 9 MMOL/L
BUN SERPL-MCNC: 20 MG/DL
CALCIUM SERPL-MCNC: 9.8 MG/DL
CHLORIDE SERPL-SCNC: 104 MMOL/L
CO2 SERPL-SCNC: 27 MMOL/L
CREAT SERPL-MCNC: 1.16 MG/DL
EGFR: 48 ML/MIN/1.73M2
GLUCOSE SERPL-MCNC: 94 MG/DL
POTASSIUM SERPL-SCNC: 5.5 MMOL/L
SODIUM SERPL-SCNC: 140 MMOL/L

## 2023-10-04 ENCOUNTER — NON-APPOINTMENT (OUTPATIENT)
Age: 78
End: 2023-10-04

## 2023-10-04 PROBLEM — R79.89 CREATININE ELEVATION: Status: ACTIVE | Noted: 2018-08-20

## 2024-06-18 ENCOUNTER — RX RENEWAL (OUTPATIENT)
Age: 79
End: 2024-06-18

## 2024-06-20 ENCOUNTER — APPOINTMENT (OUTPATIENT)
Dept: FAMILY MEDICINE | Facility: CLINIC | Age: 79
End: 2024-06-20

## 2024-06-20 VITALS
HEART RATE: 71 BPM | DIASTOLIC BLOOD PRESSURE: 66 MMHG | SYSTOLIC BLOOD PRESSURE: 122 MMHG | OXYGEN SATURATION: 97 % | HEIGHT: 65 IN | BODY MASS INDEX: 23.82 KG/M2 | WEIGHT: 143 LBS

## 2024-06-20 PROCEDURE — G2211 COMPLEX E/M VISIT ADD ON: CPT

## 2024-06-20 PROCEDURE — 99213 OFFICE O/P EST LOW 20 MIN: CPT

## 2024-06-20 NOTE — HISTORY OF PRESENT ILLNESS
[de-identified] : . CHRONIC HISTORY OF HYPERTENSION ON PROPRANOLOL. SAW ENT FOR HEARING EVALUATION.  HAD MRI - ADVISED OKAY.  CONSIDERING HEARING AID. GETTING PROLIA FROM RHEUMATOLOGY.

## 2024-11-22 ENCOUNTER — NON-APPOINTMENT (OUTPATIENT)
Age: 79
End: 2024-11-22

## 2024-11-22 ENCOUNTER — APPOINTMENT (OUTPATIENT)
Dept: FAMILY MEDICINE | Facility: CLINIC | Age: 79
End: 2024-11-22
Payer: MEDICARE

## 2024-11-22 VITALS
WEIGHT: 144 LBS | HEART RATE: 65 BPM | DIASTOLIC BLOOD PRESSURE: 68 MMHG | SYSTOLIC BLOOD PRESSURE: 120 MMHG | OXYGEN SATURATION: 98 % | BODY MASS INDEX: 23.99 KG/M2 | HEIGHT: 65 IN

## 2024-11-22 DIAGNOSIS — Z23 ENCOUNTER FOR IMMUNIZATION: ICD-10-CM

## 2024-11-22 DIAGNOSIS — Z00.00 ENCOUNTER FOR GENERAL ADULT MEDICAL EXAMINATION W/OUT ABNORMAL FINDINGS: ICD-10-CM

## 2024-11-22 DIAGNOSIS — Z92.29 PERSONAL HISTORY OF OTHER DRUG THERAPY: ICD-10-CM

## 2024-11-22 DIAGNOSIS — H26.9 UNSPECIFIED CATARACT: ICD-10-CM

## 2024-11-22 PROCEDURE — G0439: CPT

## 2024-11-22 PROCEDURE — 93000 ELECTROCARDIOGRAM COMPLETE: CPT

## 2024-11-22 PROCEDURE — 90677 PCV20 VACCINE IM: CPT

## 2024-11-22 PROCEDURE — G0009: CPT

## 2024-11-23 PROBLEM — Z23 NEED FOR PNEUMOCOCCAL VACCINATION: Status: ACTIVE | Noted: 2024-11-22

## 2024-11-25 ENCOUNTER — APPOINTMENT (OUTPATIENT)
Dept: FAMILY MEDICINE | Facility: CLINIC | Age: 79
End: 2024-11-25
Payer: MEDICARE

## 2024-11-25 VITALS
BODY MASS INDEX: 23.99 KG/M2 | WEIGHT: 144 LBS | DIASTOLIC BLOOD PRESSURE: 70 MMHG | HEART RATE: 77 BPM | HEIGHT: 65 IN | SYSTOLIC BLOOD PRESSURE: 134 MMHG | OXYGEN SATURATION: 96 %

## 2024-11-25 DIAGNOSIS — Z83.49 FAMILY HISTORY OF OTHER ENDOCRINE, NUTRITIONAL AND METABOLIC DISEASES: ICD-10-CM

## 2024-11-25 DIAGNOSIS — Z12.39 ENCOUNTER FOR OTHER SCREENING FOR MALIGNANT NEOPLASM OF BREAST: ICD-10-CM

## 2024-11-25 DIAGNOSIS — E78.00 PURE HYPERCHOLESTEROLEMIA, UNSPECIFIED: ICD-10-CM

## 2024-11-25 DIAGNOSIS — R71.8 OTHER ABNORMALITY OF RED BLOOD CELLS: ICD-10-CM

## 2024-11-25 DIAGNOSIS — R79.89 OTHER SPECIFIED ABNORMAL FINDINGS OF BLOOD CHEMISTRY: ICD-10-CM

## 2024-11-25 PROCEDURE — G2211 COMPLEX E/M VISIT ADD ON: CPT

## 2024-11-25 PROCEDURE — 99213 OFFICE O/P EST LOW 20 MIN: CPT

## 2024-11-28 PROBLEM — R79.89 ABNORMAL THYROID BLOOD TEST: Status: ACTIVE | Noted: 2024-11-25

## 2024-11-28 PROBLEM — Z12.39 BREAST SCREENING: Status: ACTIVE | Noted: 2018-03-19

## 2025-01-06 ENCOUNTER — OUTPATIENT (OUTPATIENT)
Dept: OUTPATIENT SERVICES | Facility: HOSPITAL | Age: 80
LOS: 1 days | End: 2025-01-06
Payer: MEDICARE

## 2025-01-06 ENCOUNTER — APPOINTMENT (OUTPATIENT)
Dept: ULTRASOUND IMAGING | Facility: CLINIC | Age: 80
End: 2025-01-06
Payer: MEDICARE

## 2025-01-06 DIAGNOSIS — Z00.00 ENCOUNTER FOR GENERAL ADULT MEDICAL EXAMINATION WITHOUT ABNORMAL FINDINGS: ICD-10-CM

## 2025-01-06 PROCEDURE — 76775 US EXAM ABDO BACK WALL LIM: CPT

## 2025-01-06 PROCEDURE — 76775 US EXAM ABDO BACK WALL LIM: CPT | Mod: 26

## 2025-01-10 ENCOUNTER — RX RENEWAL (OUTPATIENT)
Age: 80
End: 2025-01-10

## 2025-02-28 ENCOUNTER — APPOINTMENT (OUTPATIENT)
Dept: NEPHROLOGY | Facility: CLINIC | Age: 80
End: 2025-02-28
Payer: MEDICARE

## 2025-02-28 ENCOUNTER — NON-APPOINTMENT (OUTPATIENT)
Age: 80
End: 2025-02-28

## 2025-02-28 VITALS
BODY MASS INDEX: 23.32 KG/M2 | OXYGEN SATURATION: 100 % | HEIGHT: 65 IN | SYSTOLIC BLOOD PRESSURE: 132 MMHG | WEIGHT: 140 LBS | HEART RATE: 65 BPM | DIASTOLIC BLOOD PRESSURE: 68 MMHG

## 2025-02-28 PROCEDURE — 99203 OFFICE O/P NEW LOW 30 MIN: CPT

## 2025-05-22 ENCOUNTER — APPOINTMENT (OUTPATIENT)
Dept: ORTHOPEDIC SURGERY | Facility: CLINIC | Age: 80
End: 2025-05-22
Payer: MEDICARE

## 2025-05-22 VITALS
DIASTOLIC BLOOD PRESSURE: 74 MMHG | SYSTOLIC BLOOD PRESSURE: 124 MMHG | WEIGHT: 140 LBS | HEIGHT: 65 IN | BODY MASS INDEX: 23.32 KG/M2

## 2025-05-22 DIAGNOSIS — M70.61 TROCHANTERIC BURSITIS, RIGHT HIP: ICD-10-CM

## 2025-05-22 PROCEDURE — 99204 OFFICE O/P NEW MOD 45 MIN: CPT

## 2025-05-22 PROCEDURE — 73502 X-RAY EXAM HIP UNI 2-3 VIEWS: CPT

## 2025-05-22 PROCEDURE — G2211 COMPLEX E/M VISIT ADD ON: CPT

## 2025-06-02 ENCOUNTER — APPOINTMENT (OUTPATIENT)
Dept: FAMILY MEDICINE | Facility: CLINIC | Age: 80
End: 2025-06-02

## 2025-06-02 VITALS
BODY MASS INDEX: 23.63 KG/M2 | OXYGEN SATURATION: 97 % | SYSTOLIC BLOOD PRESSURE: 106 MMHG | DIASTOLIC BLOOD PRESSURE: 70 MMHG | WEIGHT: 142 LBS | HEART RATE: 71 BPM

## 2025-06-02 VITALS — SYSTOLIC BLOOD PRESSURE: 118 MMHG | DIASTOLIC BLOOD PRESSURE: 72 MMHG

## 2025-06-02 PROCEDURE — 99214 OFFICE O/P EST MOD 30 MIN: CPT
